# Patient Record
Sex: MALE | Race: ASIAN | NOT HISPANIC OR LATINO | URBAN - METROPOLITAN AREA
[De-identification: names, ages, dates, MRNs, and addresses within clinical notes are randomized per-mention and may not be internally consistent; named-entity substitution may affect disease eponyms.]

---

## 2018-03-08 ENCOUNTER — OFFICE VISIT (OUTPATIENT)
Dept: UROLOGY | Facility: CLINIC | Age: 33
End: 2018-03-08
Payer: COMMERCIAL

## 2018-03-08 VITALS — DIASTOLIC BLOOD PRESSURE: 97 MMHG | SYSTOLIC BLOOD PRESSURE: 148 MMHG | HEART RATE: 75 BPM | WEIGHT: 176.13 LBS

## 2018-03-08 DIAGNOSIS — N50.89 SWELLING OF RIGHT TESTICLE: ICD-10-CM

## 2018-03-08 DIAGNOSIS — N50.811 PAIN IN RIGHT TESTICLE: Primary | ICD-10-CM

## 2018-03-08 DIAGNOSIS — N45.1 RIGHT EPIDIDYMITIS: ICD-10-CM

## 2018-03-08 LAB
BACTERIA #/AREA URNS AUTO: ABNORMAL /HPF
MICROSCOPIC COMMENT: ABNORMAL
RBC #/AREA URNS AUTO: 5 /HPF (ref 0–4)
SQUAMOUS #/AREA URNS AUTO: 0 /HPF
WBC #/AREA URNS AUTO: 0 /HPF (ref 0–5)

## 2018-03-08 PROCEDURE — 99203 OFFICE O/P NEW LOW 30 MIN: CPT | Mod: SA,25,S$GLB, | Performed by: NURSE PRACTITIONER

## 2018-03-08 PROCEDURE — 81001 URINALYSIS AUTO W/SCOPE: CPT

## 2018-03-08 PROCEDURE — 81002 URINALYSIS NONAUTO W/O SCOPE: CPT | Mod: S$GLB,,, | Performed by: NURSE PRACTITIONER

## 2018-03-08 PROCEDURE — 87086 URINE CULTURE/COLONY COUNT: CPT

## 2018-03-08 PROCEDURE — 99999 PR PBB SHADOW E&M-NEW PATIENT-LVL IV: CPT | Mod: PBBFAC,,, | Performed by: NURSE PRACTITIONER

## 2018-03-08 RX ORDER — NAPROXEN 500 MG/1
500 TABLET ORAL 2 TIMES DAILY WITH MEALS
Qty: 28 TABLET | Refills: 0 | Status: SHIPPED | OUTPATIENT
Start: 2018-03-08 | End: 2018-03-22

## 2018-03-08 RX ORDER — DOXYCYCLINE 100 MG/1
100 CAPSULE ORAL EVERY 12 HOURS
Qty: 20 CAPSULE | Refills: 0 | Status: SHIPPED | OUTPATIENT
Start: 2018-03-08 | End: 2018-03-18

## 2018-03-08 NOTE — PATIENT INSTRUCTIONS
Scrotal Support with tight fitted underwear or jock strap  Naproxen twice a day with food for 14 days then as needed. If he is unable to get prescription for naproxen filled, can take over the counter anti-inflammatory.  No heavy lifting until pain subsides.

## 2018-03-08 NOTE — PROGRESS NOTES
Subjective:       Patient ID: Marina Guerrero is a 32 y.o. male.    Chief Complaint: Testicle Pain (right testicle pain, brown semen, no problems with urination)      HPI: Marina Guerrero is a 32 y.o.  male who presents today for evaluation and management of right testicle pain. This is his initial visit to clinic.  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    Today he presents for right testicle pain for the past month. He also noticed his semen to be brown in color. He reports the pain is an intermittent burning pain, 3/10 at worse that radiates to right groin at times. The pain is worse when he is lifting heavy objects at work and is relieved with sitting or laying down. He reports swelling that has improved over the past month. He was seen by the doctor on the ship and given Cipro and naproxen but minimal relief was obtained. He denies any difficulty urinating, dysuria, hematuria, flank pain, frequency, urgency, or penile discharge. He denies any recurrent UTI's. He denies any fever, chills, nausea or vomiting. He reports he had this same experience 3 years ago that was treated with an antibiotic and resolved but he is unsure of the medication given.     Review of patient's allergies indicates:  No Known Allergies    Current Outpatient Prescriptions   Medication Sig Dispense Refill    doxycycline (MONODOX) 100 MG capsule Take 1 capsule (100 mg total) by mouth every 12 (twelve) hours. 20 capsule 0    naproxen (NAPROSYN) 500 MG tablet Take 1 tablet (500 mg total) by mouth 2 (two) times daily with meals. 28 tablet 0     No current facility-administered medications for this visit.        Past Medical History:   Diagnosis Date    Prostatitis 2015       History reviewed. No pertinent surgical history.    History reviewed. No pertinent family history.    Review of Systems   Constitutional: Negative for chills, fatigue and fever.   HENT: Negative for congestion and trouble  swallowing.    Eyes: Negative for visual disturbance.   Respiratory: Negative for chest tightness and shortness of breath.    Cardiovascular: Negative for chest pain, palpitations and leg swelling.   Gastrointestinal: Negative for abdominal pain, constipation, diarrhea, nausea and vomiting.   Genitourinary: Positive for scrotal swelling and testicular pain. Negative for decreased urine volume, difficulty urinating, discharge, dysuria, flank pain, frequency, hematuria and urgency.   Musculoskeletal: Negative for back pain and gait problem.   Skin: Negative for rash.   Allergic/Immunologic: Negative for immunocompromised state.   Neurological: Negative for dizziness, seizures, syncope, weakness, light-headedness and headaches.   Hematological: Negative for adenopathy.   Psychiatric/Behavioral: Negative for confusion. The patient is not nervous/anxious.          All other systems were reviewed and were negative.    Objective:     Vitals:    03/08/18 0945   BP: (!) 148/97   Pulse: 75        Physical Exam   Nursing note and vitals reviewed.  Constitutional: He is oriented to person, place, and time. He appears well-developed and well-nourished.   HENT:   Head: Normocephalic and atraumatic.   Eyes: Conjunctivae and EOM are normal.   Neck: Normal range of motion.   Cardiovascular: Normal rate and regular rhythm.    Pulmonary/Chest: Effort normal. No respiratory distress.   Abdominal: Soft. He exhibits no distension. Hernia confirmed negative in the right inguinal area and confirmed negative in the left inguinal area.   Genitourinary: Prostate is not enlarged and not tender. Right testis shows tenderness. Right testis shows no mass and no swelling. Right testis is descended. Left testis shows no mass, no swelling and no tenderness. Left testis is descended. Circumcised. No penile erythema or penile tenderness. No discharge found.   Musculoskeletal: Normal range of motion. He exhibits no edema.   Neurological: He is alert  and oriented to person, place, and time.   Skin: Skin is warm and dry.     Psychiatric: He has a normal mood and affect. His behavior is normal. Judgment and thought content normal.         No results found for: CREATININE  No results found for: EGFRNONAA  No results found for: ESTGFRAFRICA    Urine dipstick in clinic: trace blood, otherwise negative    Assessment:       1. Pain in right testicle    2. Swelling of right testicle    3. Right epididymitis        Plan:     Marina was seen today for testicle pain.    Diagnoses and all orders for this visit:    Pain in right testicle  -     POCT urinalysis, dipstick or tablet reag  -     Urine culture  -     US Scrotum And Testicles; Future  -     naproxen (NAPROSYN) 500 MG tablet; Take 1 tablet (500 mg total) by mouth 2 (two) times daily with meals.  -     Urinalysis Microscopic    Swelling of right testicle  -     POCT urinalysis, dipstick or tablet reag  -     Urine culture  -     US Scrotum And Testicles; Future  -     naproxen (NAPROSYN) 500 MG tablet; Take 1 tablet (500 mg total) by mouth 2 (two) times daily with meals.  -     Urinalysis Microscopic    Right epididymitis    Other orders  -     Cancel: POCT urinalysis, dipstick or tablet reag  -     doxycycline (MONODOX) 100 MG capsule; Take 1 capsule (100 mg total) by mouth every 12 (twelve) hours.    -Discussed plan of care with patient  -Urine specimen collected sent for UC and UA  -Start doxycycline. Discussed side effects, indications, and MOA for doxycycline. Prescription given to patient. Pt verbalized understanding.  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 He will try to have US done next week [place].  -Reassured  patient that there is no concern for brown semen.  -Follow up prn     I spent 30 minutes with the patient of which more than half was spent in coordinating the patient's care as well as in direct consultation with the patient in regards to our treatment and plan.

## 2018-03-08 NOTE — LETTER
March 8, 2018      [facility] 4th Floor  [address]  [facility] [address]  Phone: [phone]       Patient: Marina Guerrero   YOB: 1985  Date of Visit: 03/08/2018    To Whom It May Concern:    Alli Guerrero  was at [facility] on 03/08/2018. He may return to work/school on 3/9/18 with restrictions, no heavy lifting until pain resides. If you have any questions or concerns, or if I can be of further assistance, please do not hesitate to contact me.    Sincerely,        Julia Lawson NP

## 2018-03-09 LAB — BACTERIA UR CULT: NO GROWTH

## 2018-03-12 ENCOUNTER — TELEPHONE (OUTPATIENT)
Dept: UROLOGY | Facility: CLINIC | Age: 33
End: 2018-03-12

## 2018-03-12 ENCOUNTER — HOSPITAL ENCOUNTER (OUTPATIENT)
Dept: RADIOLOGY | Facility: OTHER | Age: 33
Discharge: HOME | End: 2018-03-12
Attending: NURSE PRACTITIONER
Payer: COMMERCIAL

## 2018-03-12 DIAGNOSIS — N50.89 SWELLING OF RIGHT TESTICLE: ICD-10-CM

## 2018-03-12 DIAGNOSIS — N50.811 PAIN IN RIGHT TESTICLE: ICD-10-CM

## 2018-03-12 PROCEDURE — 76870 US EXAM SCROTUM: CPT | Mod: 26,,, | Performed by: RADIOLOGY

## 2018-03-12 PROCEDURE — 76870 US EXAM SCROTUM: CPT | Mod: TC

## 2018-03-12 NOTE — TELEPHONE ENCOUNTER
Spoke with patient regarding US results from today. Discussed his US (scrotum and testicle) results are normal and that his UC was negative for infection. His microscopic UA showed RBC 5 and discussed with patient the need for CT urogram and cysto. He reported he is still taking the medication and symptoms are a little better and would rather wait to have those tests done. 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 He mentioned when he came [place] to port if his symptoms were still occurring he will make another appt and will set up those tests.        ----- Message from Lucía Lopez sent at 3/12/2018 11:04 AM CDT -----  Contact: self - [phone]  SUZETTE = had u/s - is asking what he needs to do now - please call patient at  [phone]